# Patient Record
Sex: MALE | Race: BLACK OR AFRICAN AMERICAN | NOT HISPANIC OR LATINO | URBAN - METROPOLITAN AREA
[De-identification: names, ages, dates, MRNs, and addresses within clinical notes are randomized per-mention and may not be internally consistent; named-entity substitution may affect disease eponyms.]

---

## 2024-05-31 ENCOUNTER — OFFICE VISIT (OUTPATIENT)
Dept: URGENT CARE | Facility: CLINIC | Age: 34
End: 2024-05-31
Payer: COMMERCIAL

## 2024-05-31 VITALS
TEMPERATURE: 99 F | BODY MASS INDEX: 25.18 KG/M2 | OXYGEN SATURATION: 96 % | DIASTOLIC BLOOD PRESSURE: 87 MMHG | SYSTOLIC BLOOD PRESSURE: 129 MMHG | WEIGHT: 190 LBS | HEART RATE: 70 BPM | HEIGHT: 73 IN

## 2024-05-31 DIAGNOSIS — Z20.2 POSSIBLE EXPOSURE TO STD: Primary | ICD-10-CM

## 2024-05-31 PROCEDURE — 36415 COLL VENOUS BLD VENIPUNCTURE: CPT | Performed by: NURSE PRACTITIONER

## 2024-05-31 PROCEDURE — 87661 TRICHOMONAS VAGINALIS AMPLIF: CPT | Performed by: NURSE PRACTITIONER

## 2024-05-31 PROCEDURE — 87340 HEPATITIS B SURFACE AG IA: CPT | Performed by: NURSE PRACTITIONER

## 2024-05-31 PROCEDURE — 87389 HIV-1 AG W/HIV-1&-2 AB AG IA: CPT | Performed by: NURSE PRACTITIONER

## 2024-05-31 PROCEDURE — 87591 N.GONORRHOEAE DNA AMP PROB: CPT | Performed by: NURSE PRACTITIONER

## 2024-05-31 PROCEDURE — 86803 HEPATITIS C AB TEST: CPT | Performed by: NURSE PRACTITIONER

## 2024-05-31 PROCEDURE — 99203 OFFICE O/P NEW LOW 30 MIN: CPT | Mod: S$GLB,,, | Performed by: NURSE PRACTITIONER

## 2024-05-31 PROCEDURE — 86593 SYPHILIS TEST NON-TREP QUANT: CPT | Performed by: NURSE PRACTITIONER

## 2024-05-31 NOTE — PROGRESS NOTES
"Subjective:      Patient ID: Nehemias Nugent is a 34 y.o. male.    Vitals:  height is 6' 1" (1.854 m) and weight is 86.2 kg (190 lb). His oral temperature is 98.8 °F (37.1 °C). His blood pressure is 129/87 and his pulse is 70. His oxygen saturation is 96%.     Chief Complaint: Exposure to STD      Pt is a 35 yo male presenting with STD screening.  Pt denies current symptoms or known exposure.      Exposure to STD  The patient's pertinent negatives include no genital itching, genital lesions, pelvic pain, penile discharge, penile pain, priapism, scrotal swelling or testicular pain. This is a new problem. The current episode started today. The patient is experiencing no pain. Pertinent negatives include no abdominal pain, anorexia, chest pain, chills, constipation, coughing, diarrhea, discolored urine, fever, flank pain, frequency, headaches, hematuria, hesitancy, joint pain, joint swelling, nausea, painful intercourse, rash, shortness of breath, sore throat, urgency, urinary retention or vomiting. Nothing aggravates the symptoms. He has tried nothing for the symptoms. The treatment provided no relief. He is sexually active. He inconsistently uses condoms. It is unknown whether or not his partner has an STD. There is no history of BPH, chlamydia, cryptorchidism, a femoral hernia, gonorrhea, herpes simplex, HIV, an inguinal hernia, kidney stones, sickle cell disease, syphilis or varicocele.       Constitution: Negative for chills, fatigue and fever.   HENT:  Negative for sore throat.    Cardiovascular:  Negative for chest pain.   Respiratory:  Negative for chest tightness, cough and shortness of breath.    Gastrointestinal:  Negative for abdominal pain, nausea, vomiting, constipation and diarrhea.   Genitourinary:  Negative for frequency, urgency, flank pain, penile discharge, penile pain, scrotal swelling, testicular pain and pelvic pain.   Skin:  Negative for rash.   Neurological:  Negative for headaches.    "   Objective:     Physical Exam   HENT:   Mouth/Throat: Uvula is midline, oropharynx is clear and moist and mucous membranes are normal. No trismus in the jaw. No uvula swelling. No oropharyngeal exudate, posterior oropharyngeal edema or posterior oropharyngeal erythema.   Eyes: EOM are normal.   Cardiovascular: Normal rate, regular rhythm and normal heart sounds.   Pulmonary/Chest: Effort normal and breath sounds normal. No stridor. No respiratory distress.   Nursing note and vitals reviewed.      Assessment:     1. Possible exposure to STD        Plan:       Possible exposure to STD  -     C. trachomatis/N. gonorrhoeae by AMP DNA [place]; Urine  -     Trichomonas vaginalis, RNA, Qual, Urine  -     Hepatitis B Surface Antigen  -     Hepatitis C Antibody  -     HIV 1/2 Ag/Ab (4th Gen)  -     Treponema Pallidium Antibodies IgG, IgM      Patient Instructions   Possible exposure to STD      Collected in clinic and sent to Lab:  -     Urine testing - Chlamydia, Gonorrhea, trich  -     Blood testing - Hepatitis B, Hepatitis C, HIV, Syphilis (Treponema with reflex to RPR)    Complete ALL medications prescribed (if required).      We will call you in 3-7 days with the results of the testing. If you need more medication when the labs result come in, we will call you and phone them in for you.  PLEASE SET UP YOUR [facility] [employer] ACCOUNT SO THAT YOU CAN RECEIVE YOUR LAB RESULTS ONCE THEY RETURN.    Increase condom use to prevent further occurance.    NO sexual intercourse for 7-14 days after treatment (if required).         IF POSITIVE:  Notify sexual partners of the need for testing.      NO sexual intercourse until given all lab results and appropriate treatment.     Retest to ensure infection has cleared-there are infections that require more agressive treatment.  Retest for all in 3-6 weeks (if still have symptoms) and again in 3-6 months to ensure true negative results.       ED Precautions   If your symptoms worsen or fail to  improve you should go to Emergency Department.

## 2024-05-31 NOTE — PATIENT INSTRUCTIONS
Possible exposure to STD      Collected in clinic and sent to Lab:  -     Urine testing - Chlamydia, Gonorrhea, trich  -     Blood testing - Hepatitis B, Hepatitis C, HIV, Syphilis (Treponema with reflex to RPR)    Complete ALL medications prescribed (if required).      We will call you in 3-7 days with the results of the testing. If you need more medication when the labs result come in, we will call you and phone them in for you.  PLEASE SET UP YOUR [facility] ACCOUNT SO THAT YOU CAN RECEIVE YOUR LAB RESULTS ONCE THEY RETURN.    Increase condom use to prevent further occurance.    NO sexual intercourse for 7-14 days after treatment (if required).         IF POSITIVE:  Notify sexual partners of the need for testing.      NO sexual intercourse until given all lab results and appropriate treatment.     Retest to ensure infection has cleared-there are infections that require more agressive treatment.  Retest for all in 3-6 weeks (if still have symptoms) and again in 3-6 months to ensure true negative results.       ED Precautions   If your symptoms worsen or fail to improve you should go to Emergency Department.

## 2024-06-01 LAB
HBV SURFACE AG SERPL QL IA: NORMAL
HCV AB SERPL QL IA: NORMAL
HIV 1+2 AB+HIV1 P24 AG SERPL QL IA: NORMAL
TREPONEMA PALLIDUM IGG+IGM AB [PRESENCE] IN SERUM OR PLASMA BY IMMUNOASSAY: NONREACTIVE

## 2024-06-03 ENCOUNTER — PATIENT MESSAGE (OUTPATIENT)
Dept: URGENT CARE | Facility: CLINIC | Age: 34
End: 2024-06-03
Payer: COMMERCIAL

## 2024-06-03 LAB
C TRACH DNA SPEC QL NAA+PROBE: NOT DETECTED
N GONORRHOEA DNA SPEC QL NAA+PROBE: NOT DETECTED

## 2024-06-05 LAB
SPECIMEN SOURCE: NORMAL
T VAGINALIS RRNA SPEC QL NAA+PROBE: NEGATIVE